# Patient Record
Sex: MALE | Race: WHITE | NOT HISPANIC OR LATINO | Employment: FULL TIME | URBAN - METROPOLITAN AREA
[De-identification: names, ages, dates, MRNs, and addresses within clinical notes are randomized per-mention and may not be internally consistent; named-entity substitution may affect disease eponyms.]

---

## 2024-05-31 ENCOUNTER — HOSPITAL ENCOUNTER (OUTPATIENT)
Dept: RADIOLOGY | Facility: MEDICAL CENTER | Age: 34
End: 2024-05-31
Attending: PHYSICIAN ASSISTANT
Payer: COMMERCIAL

## 2024-05-31 ENCOUNTER — OFFICE VISIT (OUTPATIENT)
Dept: URGENT CARE | Facility: PHYSICIAN GROUP | Age: 34
End: 2024-05-31
Payer: COMMERCIAL

## 2024-05-31 ENCOUNTER — HOSPITAL ENCOUNTER (OUTPATIENT)
Facility: MEDICAL CENTER | Age: 34
End: 2024-05-31
Attending: PHYSICIAN ASSISTANT
Payer: COMMERCIAL

## 2024-05-31 VITALS
HEART RATE: 48 BPM | OXYGEN SATURATION: 99 % | SYSTOLIC BLOOD PRESSURE: 124 MMHG | RESPIRATION RATE: 12 BRPM | HEIGHT: 69 IN | TEMPERATURE: 97.4 F | DIASTOLIC BLOOD PRESSURE: 80 MMHG | BODY MASS INDEX: 29.38 KG/M2 | WEIGHT: 198.4 LBS

## 2024-05-31 DIAGNOSIS — R19.7 DIARRHEA, UNSPECIFIED TYPE: ICD-10-CM

## 2024-05-31 DIAGNOSIS — R10.30 LOWER ABDOMINAL PAIN: ICD-10-CM

## 2024-05-31 LAB
C DIFF DNA SPEC QL NAA+PROBE: NEGATIVE
C DIFF TOX GENS STL QL NAA+PROBE: NEGATIVE

## 2024-05-31 PROCEDURE — 87328 CRYPTOSPORIDIUM AG IA: CPT

## 2024-05-31 PROCEDURE — 87045 FECES CULTURE AEROBIC BACT: CPT

## 2024-05-31 PROCEDURE — 87493 C DIFF AMPLIFIED PROBE: CPT

## 2024-05-31 PROCEDURE — 74177 CT ABD & PELVIS W/CONTRAST: CPT

## 2024-05-31 PROCEDURE — 87899 AGENT NOS ASSAY W/OPTIC: CPT

## 2024-05-31 PROCEDURE — 87329 GIARDIA AG IA: CPT

## 2024-05-31 PROCEDURE — 87046 STOOL CULTR AEROBIC BACT EA: CPT

## 2024-05-31 PROCEDURE — 700117 HCHG RX CONTRAST REV CODE 255: Performed by: PHYSICIAN ASSISTANT

## 2024-05-31 RX ORDER — DICYCLOMINE HYDROCHLORIDE 10 MG/1
10 CAPSULE ORAL
Qty: 120 CAPSULE | Refills: 0 | Status: SHIPPED | OUTPATIENT
Start: 2024-05-31

## 2024-05-31 RX ADMIN — IOHEXOL 100 ML: 350 INJECTION, SOLUTION INTRAVENOUS at 16:19

## 2024-05-31 ASSESSMENT — ENCOUNTER SYMPTOMS
EYE REDNESS: 0
DIAPHORESIS: 0
NAUSEA: 0
SINUS PAIN: 0
EYE PAIN: 0
BLOOD IN STOOL: 1
HEADACHES: 0
WHEEZING: 0
SORE THROAT: 0
ABDOMINAL PAIN: 1
CHILLS: 0
DIZZINESS: 0
VOMITING: 0
DIARRHEA: 1
CONSTIPATION: 0
COUGH: 0
SHORTNESS OF BREATH: 0
EYE DISCHARGE: 0
FEVER: 0

## 2024-05-31 ASSESSMENT — PAIN SCALES - GENERAL: PAINLEVEL: NO PAIN

## 2024-05-31 NOTE — PROGRESS NOTES
"  Subjective:     Harjeet Pickett  is a 34 y.o. male who presents for Diarrhea (X 2 weeks with pain )       He presents today with diarrhea this been ongoing over the last 2 weeks.  Associated lower abdominal pain.  Notes cramping occurring shortly before diarrhea episodes.  Has 6 episodes of diarrhea per day.  Notes intermittent episodes of blood streaked within the stool.  Symptoms initial began as 3 days of nausea vomiting and diarrhea, patient associates this pain with stomach flu but the diarrhea has lingered.  Has taken Imodium, without relief.  States that frequency of diarrhea has decreased since symptom onset.  He denies any fevers, no chest pain or shortness of breath       Review of Systems   Constitutional:  Negative for chills, diaphoresis, fever and malaise/fatigue.   HENT:  Negative for congestion, ear discharge, sinus pain and sore throat.    Eyes:  Negative for pain, discharge and redness.   Respiratory:  Negative for cough, shortness of breath and wheezing.    Cardiovascular:  Negative for chest pain.   Gastrointestinal:  Positive for abdominal pain, blood in stool and diarrhea. Negative for constipation, nausea and vomiting.   Neurological:  Negative for dizziness and headaches.      No Known Allergies  History reviewed. No pertinent past medical history.     Objective:   /80 (BP Location: Right arm, Patient Position: Sitting, BP Cuff Size: Adult)   Pulse (!) 48   Temp 36.3 °C (97.4 °F) (Temporal)   Resp 12   Ht 1.753 m (5' 9\")   Wt 90 kg (198 lb 6.4 oz)   SpO2 99%   BMI 29.30 kg/m²   Physical Exam  Vitals and nursing note reviewed.   Constitutional:       General: He is not in acute distress.     Appearance: Normal appearance. He is not ill-appearing, toxic-appearing or diaphoretic.   HENT:      Head: Normocephalic.   Eyes:      General: No scleral icterus.     Conjunctiva/sclera: Conjunctivae normal.   Cardiovascular:      Rate and Rhythm: Normal rate and regular rhythm. "   Pulmonary:      Effort: Pulmonary effort is normal. No respiratory distress.      Breath sounds: Normal breath sounds. No stridor. No wheezing or rhonchi.   Abdominal:      General: Bowel sounds are normal. There is no distension.      Tenderness: There is abdominal tenderness (Left lower quadrant, right lower quadrant). There is guarding (Over left lower quadrant and right lower quadrant). There is no right CVA tenderness or left CVA tenderness.   Musculoskeletal:      Cervical back: Neck supple.   Neurological:      Mental Status: He is alert and oriented to person, place, and time.   Psychiatric:         Mood and Affect: Mood normal.         Behavior: Behavior normal.         Thought Content: Thought content normal.         Judgment: Judgment normal.             Diagnostic testing:    CT scan abdomen pelvis with contrast  Radiology IMPRESSION:  1.  Normal CT scan of the abdomen and pelvis with intravenous contrast.  2.  No CT evidence of appendicitis or acute diverticulitis.  3.  No evidence of abscess formation in the abdomen or pelvis.    C. difficile, stool culture, ova and parasite-pending    Assessment/Plan:     Encounter Diagnoses   Name Primary?    Lower abdominal pain     Diarrhea, unspecified type           Plan for care for today's complaint includes start the patient on Bentyl for lower abdominal pain and intestinal cramping.  We did obtain CT scan which was normal.  Also obtain stool study testing due to ongoing diarrhea; will contact via [facility] message if testing is normal but will contact via phone call if there is abnormal results on the lab testing.  Encourage patient to use probiotic as ongoing diarrhea could be related to a functional cause.  Vital signs were stable during today's office visit, patient was overall well-appearing. Continue to monitor symptoms and return to urgent care or follow-up with primary care provider if symptoms remain ongoing.  Follow-up in the emergency department if  symptoms become severe, ER precautions discussed in office today..  Prescription for Bentyl provided.      See AVS Instructions below for written guidance provided to patient on after-visit management and care in addition to our verbal discussion during the visit.    Please note that this dictation was created using voice recognition software. I have attempted to correct all errors, but there may be sound-alike, spelling, grammar and possibly content errors that I did not discover before finalizing the note.    Tico Celaya PA-C

## 2024-06-01 LAB
C PARVUM AG STL QL IA.RAPID: NEGATIVE
E COLI SXT1+2 STL IA: NORMAL
G LAMBLIA AG STL QL IA.RAPID: NEGATIVE
SIGNIFICANT IND 70042: NORMAL
SITE SITE: NORMAL
SOURCE SOURCE: NORMAL

## 2024-06-03 DIAGNOSIS — R19.7 DIARRHEA, UNSPECIFIED TYPE: ICD-10-CM

## 2024-06-03 LAB
BACTERIA STL CULT: NORMAL
E COLI SXT1+2 STL IA: NORMAL
SIGNIFICANT IND 70042: NORMAL
SITE SITE: NORMAL
SOURCE SOURCE: NORMAL